# Patient Record
Sex: FEMALE | Race: OTHER | Employment: FULL TIME | URBAN - METROPOLITAN AREA
[De-identification: names, ages, dates, MRNs, and addresses within clinical notes are randomized per-mention and may not be internally consistent; named-entity substitution may affect disease eponyms.]

---

## 2023-08-08 ENCOUNTER — OFFICE VISIT (OUTPATIENT)
Dept: RHEUMATOLOGY | Age: 28
End: 2023-08-08
Payer: COMMERCIAL

## 2023-08-08 VITALS
SYSTOLIC BLOOD PRESSURE: 108 MMHG | WEIGHT: 219.6 LBS | DIASTOLIC BLOOD PRESSURE: 72 MMHG | HEART RATE: 86 BPM | OXYGEN SATURATION: 98 % | BODY MASS INDEX: 34.47 KG/M2 | HEIGHT: 67 IN

## 2023-08-08 DIAGNOSIS — M05.9 SEROPOSITIVE RHEUMATOID ARTHRITIS (HCC): Primary | ICD-10-CM

## 2023-08-08 PROCEDURE — 99204 OFFICE O/P NEW MOD 45 MIN: CPT | Performed by: INTERNAL MEDICINE

## 2023-08-08 RX ORDER — HYDROXYCHLOROQUINE SULFATE 200 MG/1
200 TABLET, FILM COATED ORAL DAILY
Qty: 30 TABLET | Refills: 1 | Status: SHIPPED | OUTPATIENT
Start: 2023-08-08 | End: 2023-10-07

## 2023-08-08 RX ORDER — CETIRIZINE HYDROCHLORIDE 10 MG/1
10 TABLET ORAL DAILY
COMMUNITY

## 2023-08-08 RX ORDER — ACETAMINOPHEN 500 MG
500 TABLET ORAL EVERY 6 HOURS PRN
COMMUNITY

## 2023-08-08 ASSESSMENT — ENCOUNTER SYMPTOMS
BLOOD IN STOOL: 0
SHORTNESS OF BREATH: 0
ABDOMINAL PAIN: 0
COUGH: 0
NAUSEA: 0
VOMITING: 0

## 2023-08-08 ASSESSMENT — JOINT PAIN
TOTAL NUMBER OF TENDER JOINTS: 6
TOTAL NUMBER OF SWOLLEN JOINTS: 0

## 2023-08-08 NOTE — PROGRESS NOTES
Affect: Mood normal.      Physical Exam        DOYLE-28 (ESR): --  DOYLE-28 (CRP): --         DATA:                  IMPRESSION/RECOMMENDATIONS:      1. Seropositive rheumatoid arthritis (-RF, +CCP) with low disease activity. Discussed with pt the diagnosis and treatment plan. -- trial of hydroxychloroquine 200 mg daily. Discussed with pt the benefits, risks and adverse effects of this medication. Patient expressed understanding. 2. Headaches. Discussed with pt that is is unlikely that her RA is playing a role with her headaches. -- consider referral to neurology if worsening.     RTC in 6 weeks      Orders Placed This Encounter   Procedures    CBC with Auto Differential     Standing Status:   Future     Number of Occurrences:   1     Standing Expiration Date:   2/8/2024    Comprehensive Metabolic Panel     Standing Status:   Future     Number of Occurrences:   1     Standing Expiration Date:   2/8/2024    Sedimentation Rate     Standing Status:   Future     Number of Occurrences:   1     Standing Expiration Date:   2/8/2024    C-Reactive Protein     Standing Status:   Future     Number of Occurrences:   1     Standing Expiration Date:   2/8/2024        Dav Castillo MD    [address] [facility],First Floor  [address]  [facility]

## 2023-09-19 ENCOUNTER — OFFICE VISIT (OUTPATIENT)
Dept: RHEUMATOLOGY | Age: 28
End: 2023-09-19
Payer: COMMERCIAL

## 2023-09-19 VITALS
DIASTOLIC BLOOD PRESSURE: 74 MMHG | OXYGEN SATURATION: 99 % | BODY MASS INDEX: 34.37 KG/M2 | WEIGHT: 219 LBS | SYSTOLIC BLOOD PRESSURE: 116 MMHG | HEIGHT: 67 IN | HEART RATE: 74 BPM

## 2023-09-19 DIAGNOSIS — M05.9 SEROPOSITIVE RHEUMATOID ARTHRITIS (HCC): ICD-10-CM

## 2023-09-19 DIAGNOSIS — G43.919 INTRACTABLE MIGRAINE WITHOUT STATUS MIGRAINOSUS, UNSPECIFIED MIGRAINE TYPE: Primary | ICD-10-CM

## 2023-09-19 PROCEDURE — 99214 OFFICE O/P EST MOD 30 MIN: CPT | Performed by: INTERNAL MEDICINE

## 2023-09-19 RX ORDER — HYDROXYCHLOROQUINE SULFATE 200 MG/1
200 TABLET, FILM COATED ORAL DAILY
Qty: 90 TABLET | Refills: 1 | Status: SHIPPED | OUTPATIENT
Start: 2023-09-19 | End: 2024-03-17

## 2023-09-19 ASSESSMENT — ENCOUNTER SYMPTOMS
COUGH: 0
BLOOD IN STOOL: 0
NAUSEA: 0
VOMITING: 0
SHORTNESS OF BREATH: 0
ABDOMINAL PAIN: 0

## 2023-09-19 NOTE — PROGRESS NOTES
[facility]) Physicians   Rheumatology Clinic Note      9/19/2023       CHIEF COMPLAINT:    Chief Complaint   Patient presents with    Follow-up     6 week f/u Seropositive rheumatoid arthritis ([address])    Joint Pain     Pt has noticed when she misses a dose of plaquenil, she gets tingling in the tips of her fingers that will last a couple days. Pain score 7 due to a headache,          HISTORY OF PRESENT ILLNESS:    32 y.o. female with recent diagnosis of seropositive rheumatoid arthritis (-RF, +CCP) presents for follow up. When seen last, she was started on hydroxychloroquine 200 mg daily. Believes that the hydroxychloroquine helps with her joint pain and stiffness. Pain today is related to headaches. She wakes up with headaches, starts at the nape of the neck and radiates to her scalp. No worsening headaches since starting hydroxychloroquine. She is nursing her 9-month old baby. RECAP:  Reports having headaches chronically. However, over past couple of months, reports having persistent headaches daily. This is less intense since receiving Toradol IM few weeks ago. Has Raynaud's that was diagnosed in eighth grade. Has prolonged moring stiffness in neck, back and hands. Stiffness in hands that lasts all day. Noted swelling in her hands. Has mild tolerable achy pain in the hands. Rates the pain at 2-3/10 in pain scale. Pain in hands and lower back. Constant achy. Fine hand movements aggravate the pain. Unaware of any relieving factors. Mother has RA. Past Medical History:     has a past medical history of Raynaud's disease. Past Surgical History:     has no past surgical history on file.     Current Medications:      Current Outpatient Medications:     cetirizine (ZYRTEC) 10 MG tablet, Take 1 tablet by mouth daily, Disp: , Rfl:     acetaminophen (TYLENOL) 500 MG tablet, Take 1 tablet by mouth every 6 hours as needed for Pain, Disp: , Rfl:     hydroxychloroquine (PLAQUENIL) 200 MG tablet,

## 2023-11-07 ENCOUNTER — PATIENT MESSAGE (OUTPATIENT)
Dept: RHEUMATOLOGY | Age: 28
End: 2023-11-07

## 2023-11-07 DIAGNOSIS — M05.9 SEROPOSITIVE RHEUMATOID ARTHRITIS (HCC): Primary | ICD-10-CM

## 2023-11-07 NOTE — TELEPHONE ENCOUNTER
From: Mika Bar  To: Dr. Ivonne Villeda: 11/7/2023 3:58 PM EST  Subject: Pain    Good afternoon! I called the office today to move up my follow up appointment with you due to some pain I am experiencing. They did reschedule it, but it is over a month away. I have been experiencing a lot of stiffness and pain the last couple weeks. My shoulders are very stiff and certain movements are painful. Walking is causing pain in my hip. My hands are getting worse as well. My three year old pulled on my thumb while walking to the car this morning and it is since been horribly painful. It is to the point that I am trying not to use it. Is there anything I can do for all of this pain and stiffness? The last couple weeks I have felt worse than I did prior to starting the medication.

## 2023-11-08 RX ORDER — PREDNISONE 10 MG/1
TABLET ORAL
Qty: 10 TABLET | Refills: 0 | Status: SHIPPED | OUTPATIENT
Start: 2023-11-08 | End: 2023-11-17

## 2023-11-16 ENCOUNTER — PATIENT MESSAGE (OUTPATIENT)
Dept: RHEUMATOLOGY | Age: 28
End: 2023-11-16

## 2023-11-16 DIAGNOSIS — R47.01 APHASIA: ICD-10-CM

## 2023-11-16 DIAGNOSIS — G89.29 CHRONIC INTRACTABLE HEADACHE, UNSPECIFIED HEADACHE TYPE: Primary | ICD-10-CM

## 2023-11-16 DIAGNOSIS — R51.9 CHRONIC INTRACTABLE HEADACHE, UNSPECIFIED HEADACHE TYPE: Primary | ICD-10-CM
